# Patient Record
Sex: MALE | Race: WHITE | NOT HISPANIC OR LATINO | Employment: FULL TIME | URBAN - METROPOLITAN AREA
[De-identification: names, ages, dates, MRNs, and addresses within clinical notes are randomized per-mention and may not be internally consistent; named-entity substitution may affect disease eponyms.]

---

## 2024-02-29 ENCOUNTER — HOSPITAL ENCOUNTER (OUTPATIENT)
Dept: RADIOLOGY | Facility: MEDICAL CENTER | Age: 44
End: 2024-02-29
Attending: PHYSICIAN ASSISTANT
Payer: COMMERCIAL

## 2024-02-29 ENCOUNTER — HOSPITAL ENCOUNTER (EMERGENCY)
Facility: MEDICAL CENTER | Age: 44
End: 2024-02-29
Attending: EMERGENCY MEDICINE
Payer: COMMERCIAL

## 2024-02-29 ENCOUNTER — APPOINTMENT (OUTPATIENT)
Dept: RADIOLOGY | Facility: MEDICAL CENTER | Age: 44
End: 2024-02-29
Attending: EMERGENCY MEDICINE
Payer: COMMERCIAL

## 2024-02-29 ENCOUNTER — OFFICE VISIT (OUTPATIENT)
Dept: URGENT CARE | Facility: CLINIC | Age: 44
End: 2024-02-29
Payer: COMMERCIAL

## 2024-02-29 VITALS
BODY MASS INDEX: 33.3 KG/M2 | WEIGHT: 273.59 LBS | DIASTOLIC BLOOD PRESSURE: 81 MMHG | SYSTOLIC BLOOD PRESSURE: 132 MMHG | TEMPERATURE: 98 F | HEART RATE: 71 BPM | OXYGEN SATURATION: 95 % | RESPIRATION RATE: 12 BRPM

## 2024-02-29 DIAGNOSIS — M25.572 ACUTE LEFT ANKLE PAIN: ICD-10-CM

## 2024-02-29 DIAGNOSIS — R06.00 DYSPNEA, UNSPECIFIED TYPE: ICD-10-CM

## 2024-02-29 DIAGNOSIS — R07.9 CHEST PAIN, UNSPECIFIED TYPE: ICD-10-CM

## 2024-02-29 DIAGNOSIS — I82.462 ACUTE DEEP VEIN THROMBOSIS (DVT) OF CALF MUSCLE VEIN OF LEFT LOWER EXTREMITY (HCC): ICD-10-CM

## 2024-02-29 LAB
ALBUMIN SERPL BCP-MCNC: 4.6 G/DL (ref 3.2–4.9)
ALBUMIN/GLOB SERPL: 1.4 G/DL
ALP SERPL-CCNC: 77 U/L (ref 30–99)
ALT SERPL-CCNC: 27 U/L (ref 2–50)
ANION GAP SERPL CALC-SCNC: 15 MMOL/L (ref 7–16)
APTT PPP: 31.2 SEC (ref 24.7–36)
AST SERPL-CCNC: 21 U/L (ref 12–45)
BASOPHILS # BLD AUTO: 0.8 % (ref 0–1.8)
BASOPHILS # BLD: 0.08 K/UL (ref 0–0.12)
BILIRUB SERPL-MCNC: 0.5 MG/DL (ref 0.1–1.5)
BUN SERPL-MCNC: 14 MG/DL (ref 8–22)
CALCIUM ALBUM COR SERPL-MCNC: 9.2 MG/DL (ref 8.5–10.5)
CALCIUM SERPL-MCNC: 9.7 MG/DL (ref 8.5–10.5)
CHLORIDE SERPL-SCNC: 107 MMOL/L (ref 96–112)
CO2 SERPL-SCNC: 20 MMOL/L (ref 20–33)
CREAT SERPL-MCNC: 1.01 MG/DL (ref 0.5–1.4)
EKG IMPRESSION: NORMAL
EOSINOPHIL # BLD AUTO: 0.15 K/UL (ref 0–0.51)
EOSINOPHIL NFR BLD: 1.5 % (ref 0–6.9)
ERYTHROCYTE [DISTWIDTH] IN BLOOD BY AUTOMATED COUNT: 40.6 FL (ref 35.9–50)
GFR SERPLBLD CREATININE-BSD FMLA CKD-EPI: 94 ML/MIN/1.73 M 2
GLOBULIN SER CALC-MCNC: 3.2 G/DL (ref 1.9–3.5)
GLUCOSE SERPL-MCNC: 94 MG/DL (ref 65–99)
HCT VFR BLD AUTO: 41.9 % (ref 42–52)
HGB BLD-MCNC: 15.7 G/DL (ref 14–18)
IMM GRANULOCYTES # BLD AUTO: 0.04 K/UL (ref 0–0.11)
IMM GRANULOCYTES NFR BLD AUTO: 0.4 % (ref 0–0.9)
INR PPP: 1.06 (ref 0.87–1.13)
LYMPHOCYTES # BLD AUTO: 3.11 K/UL (ref 1–4.8)
LYMPHOCYTES NFR BLD: 31.5 % (ref 22–41)
MCH RBC QN AUTO: 30.3 PG (ref 27–33)
MCHC RBC AUTO-ENTMCNC: 37.5 G/DL (ref 32.3–36.5)
MCV RBC AUTO: 80.9 FL (ref 81.4–97.8)
MONOCYTES # BLD AUTO: 0.87 K/UL (ref 0–0.85)
MONOCYTES NFR BLD AUTO: 8.8 % (ref 0–13.4)
NEUTROPHILS # BLD AUTO: 5.62 K/UL (ref 1.82–7.42)
NEUTROPHILS NFR BLD: 57 % (ref 44–72)
NRBC # BLD AUTO: 0 K/UL
NRBC BLD-RTO: 0 /100 WBC (ref 0–0.2)
NT-PROBNP SERPL IA-MCNC: <36 PG/ML (ref 0–125)
PLATELET # BLD AUTO: 402 K/UL (ref 164–446)
PMV BLD AUTO: 9.3 FL (ref 9–12.9)
POTASSIUM SERPL-SCNC: 4 MMOL/L (ref 3.6–5.5)
PROT SERPL-MCNC: 7.8 G/DL (ref 6–8.2)
PROTHROMBIN TIME: 14 SEC (ref 12–14.6)
RBC # BLD AUTO: 5.18 M/UL (ref 4.7–6.1)
SODIUM SERPL-SCNC: 142 MMOL/L (ref 135–145)
TROPONIN T SERPL-MCNC: 6 NG/L (ref 6–19)
WBC # BLD AUTO: 9.9 K/UL (ref 4.8–10.8)

## 2024-02-29 PROCEDURE — 84484 ASSAY OF TROPONIN QUANT: CPT

## 2024-02-29 PROCEDURE — 71045 X-RAY EXAM CHEST 1 VIEW: CPT

## 2024-02-29 PROCEDURE — 71275 CT ANGIOGRAPHY CHEST: CPT

## 2024-02-29 PROCEDURE — 93005 ELECTROCARDIOGRAM TRACING: CPT

## 2024-02-29 PROCEDURE — 94760 N-INVAS EAR/PLS OXIMETRY 1: CPT

## 2024-02-29 PROCEDURE — 85025 COMPLETE CBC W/AUTO DIFF WBC: CPT

## 2024-02-29 PROCEDURE — A9270 NON-COVERED ITEM OR SERVICE: HCPCS | Performed by: EMERGENCY MEDICINE

## 2024-02-29 PROCEDURE — 700102 HCHG RX REV CODE 250 W/ 637 OVERRIDE(OP): Performed by: EMERGENCY MEDICINE

## 2024-02-29 PROCEDURE — 700117 HCHG RX CONTRAST REV CODE 255: Performed by: EMERGENCY MEDICINE

## 2024-02-29 PROCEDURE — 85730 THROMBOPLASTIN TIME PARTIAL: CPT

## 2024-02-29 PROCEDURE — 85610 PROTHROMBIN TIME: CPT

## 2024-02-29 PROCEDURE — 36415 COLL VENOUS BLD VENIPUNCTURE: CPT

## 2024-02-29 PROCEDURE — 83880 ASSAY OF NATRIURETIC PEPTIDE: CPT

## 2024-02-29 PROCEDURE — 80053 COMPREHEN METABOLIC PANEL: CPT

## 2024-02-29 PROCEDURE — 99284 EMERGENCY DEPT VISIT MOD MDM: CPT

## 2024-02-29 PROCEDURE — 93971 EXTREMITY STUDY: CPT

## 2024-02-29 PROCEDURE — 93005 ELECTROCARDIOGRAM TRACING: CPT | Performed by: EMERGENCY MEDICINE

## 2024-02-29 PROCEDURE — 73700 CT LOWER EXTREMITY W/O DYE: CPT | Mod: LT

## 2024-02-29 RX ADMIN — IOHEXOL 50 ML: 350 INJECTION, SOLUTION INTRAVENOUS at 14:45

## 2024-02-29 RX ADMIN — RIVAROXABAN 15 MG: 15 TABLET, FILM COATED ORAL at 23:32

## 2024-03-01 NOTE — ED NOTES
Patient provided discharge instructions. Patient verbalized understanding. Patient leaving ER in stable condition. Patient ambulatory with steady gait.  IV removed.

## 2024-03-01 NOTE — DISCHARGE INSTRUCTIONS
Follow-up 1 to 2 days with primary care in [place] for reevaluation, medication management and assistance with urgent outpatient referral to cardiology for exertional chest pain, shortness of breath as well as new DVT.  A referral has been placed for anticoagulation clinic and cardiology.  You should receive phone calls for appointment scheduling, otherwise call,) emergency department visit and schedule appointments for follow-up.    Start taking Xarelto twice daily for 21 days for DVT.  You will need to take a different dose daily for at least 3 months thereafter.  Primary care follow-up is imperative.    We discussed admission to the hospital for further cardiac evaluation, you declined, agreed to outpatient follow-up and referrals have been placed.  You may return at any time for reevaluation and further workup.    You are encouraged to return immediately for recurrence, worsening chest pain, shortness of breath, palpitations, syncope, fever, altered mental status or other new concerns.

## 2024-03-01 NOTE — ED PROVIDER NOTES
ED Provider Note    CHIEF COMPLAINT  Chief Complaint   Patient presents with    Shortness of Breath    Chest Pain     Intermittent sx for 3 days.  Dx with DVT in L calf       EXTERNAL RECORDS REVIEWED  Outpatient Notes left ankle x-ray and CT are with arthritic changes only.  Outpatient DVT study from earlier today with occlusive DVT in the left posterior tibial vein.  Suggestion of thrombus in posterior.  Peroneal vein as well.    HPI/ROS  LIMITATION TO HISTORY   Select: : None  OUTSIDE HISTORIAN(S):  None    Anil Hutchinson is a 43 y.o. male who presents to the emergency department through triage, referred by urgent care and [facility] NP, for further evaluation.  Patient presented to [facility] earlier today for 5 days of left lower leg and ankle pain and swelling.  Denies trauma, injury.  Denies new activity, known strain pattern.  Denies discoloration.  X-ray, CT were unremarkable but DVT study positive.  Patient did have Achilles tendon rupture September 2023.  Patient does provide history for some intermittent, but chronic, chest pain or shortness of breath.  Patient states months long dyspnea on exertion, occasional chest pain on exertion and recovers with rest.  No orthopnea.  No lower extremity edema.  Patient feels as though the symptoms began after COVID last summer.    Patient has been making healthy choices and lost almost 30 pounds in the last year.  Family history includes mother with MI in her early 60s, father with critical aortic stenosis in his 60s as well.  Patient states he had a normal treadmill stress test 1 year ago in [place] and was supposed to be read forward, for risk factors alone, to cardiology here in [place] but has not done so yet.    PAST MEDICAL HISTORY   Denies    SURGICAL HISTORY  patient denies any surgical history    FAMILY HISTORY  No family history on file.    SOCIAL HISTORY  Social History     Tobacco Use    Smoking status: Never    Smokeless tobacco: Never   Substance and Sexual  Activity    Alcohol use: Not on file    Drug use: Not on file    Sexual activity: Not on file       CURRENT MEDICATIONS  Home Medications    **Home medications have not yet been reviewed for this encounter**         ALLERGIES  No Known Allergies    PHYSICAL EXAM  VITAL SIGNS: /81   Pulse 71   Temp 36.7 °C (98 °F) (Temporal)   Resp 12   Wt 124 kg (273 lb 9.5 oz)   SpO2 95%   BMI 33.30 kg/m²    Pulse ox interpretation: I interpret this pulse ox as normal.  Constitutional: Alert in no apparent distress.  HENT: Normocephalic, atraumatic. Bilateral external ears normal, Nose normal. Moist mucous membranes.    Eyes: Pupils are equal and reactive, Conjunctiva normal.   Neck: Normal range of motion, Supple.  Lymphatic: No lymphadenopathy noted.   Cardiovascular: Regular rate and rhythm, no murmurs. Distal pulses intact.  No peripheral edema.  Thorax & Lungs: Normal breath sounds.  No wheezing/rales/ronchi. No increased work of breathing, clipped speech or retractions.   Abdomen: Soft, non-distended, non-tender to palpation. No palpable or pulsatile masses. No peritoneal signs.  Skin: Warm, Dry, No erythema, No rash.   Musculoskeletal: Tenderness to palpation left superior medial malleolus.  No crepitus or deformity.  Patient does have some mild circumferential swelling at this location compared to the right but states he has had such since he had an Achilles injury in September.  No paresthesias.  Distal pulses intact, sensation intact light touch distally.  No calf swelling compared to right.  Full range of motion otherwise lower extremity  Neurologic: Alert and orient x 4  Psychiatric: Affect normal, Judgment normal, Mood normal.       DIAGNOSTIC STUDIES / PROCEDURES    LABS  Results for orders placed or performed during the hospital encounter of 02/29/24   CBC with Differential   Result Value Ref Range    WBC 9.9 4.8 - 10.8 K/uL    RBC 5.18 4.70 - 6.10 M/uL    Hemoglobin 15.7 14.0 - 18.0 g/dL    Hematocrit  41.9 (L) 42.0 - 52.0 %    MCV 80.9 (L) 81.4 - 97.8 fL    MCH 30.3 27.0 - 33.0 pg    MCHC 37.5 (H) 32.3 - 36.5 g/dL    RDW 40.6 35.9 - 50.0 fL    Platelet Count 402 164 - 446 K/uL    MPV 9.3 9.0 - 12.9 fL    Neutrophils-Polys 57.00 44.00 - 72.00 %    Lymphocytes 31.50 22.00 - 41.00 %    Monocytes 8.80 0.00 - 13.40 %    Eosinophils 1.50 0.00 - 6.90 %    Basophils 0.80 0.00 - 1.80 %    Immature Granulocytes 0.40 0.00 - 0.90 %    Nucleated RBC 0.00 0.00 - 0.20 /100 WBC    Neutrophils (Absolute) 5.62 1.82 - 7.42 K/uL    Lymphs (Absolute) 3.11 1.00 - 4.80 K/uL    Monos (Absolute) 0.87 (H) 0.00 - 0.85 K/uL    Eos (Absolute) 0.15 0.00 - 0.51 K/uL    Baso (Absolute) 0.08 0.00 - 0.12 K/uL    Immature Granulocytes (abs) 0.04 0.00 - 0.11 K/uL    NRBC (Absolute) 0.00 K/uL   Comp Metabolic Panel   Result Value Ref Range    Sodium 142 135 - 145 mmol/L    Potassium 4.0 3.6 - 5.5 mmol/L    Chloride 107 96 - 112 mmol/L    Co2 20 20 - 33 mmol/L    Anion Gap 15.0 7.0 - 16.0    Glucose 94 65 - 99 mg/dL    Bun 14 8 - 22 mg/dL    Creatinine 1.01 0.50 - 1.40 mg/dL    Calcium 9.7 8.5 - 10.5 mg/dL    Correct Calcium 9.2 8.5 - 10.5 mg/dL    AST(SGOT) 21 12 - 45 U/L    ALT(SGPT) 27 2 - 50 U/L    Alkaline Phosphatase 77 30 - 99 U/L    Total Bilirubin 0.5 0.1 - 1.5 mg/dL    Albumin 4.6 3.2 - 4.9 g/dL    Total Protein 7.8 6.0 - 8.2 g/dL    Globulin 3.2 1.9 - 3.5 g/dL    A-G Ratio 1.4 g/dL   ESTIMATED GFR   Result Value Ref Range    GFR (CKD-EPI) 94 >60 mL/min/1.73 m 2   TROPONIN   Result Value Ref Range    Troponin T 6 6 - 19 ng/L   proBrain Natriuretic Peptide, NT   Result Value Ref Range    NT-proBNP <36 0 - 125 pg/mL   PT/INR   Result Value Ref Range    PT 14.0 12.0 - 14.6 sec    INR 1.06 0.87 - 1.13   APTT   Result Value Ref Range    APTT 31.2 24.7 - 36.0 sec   EKG   Result Value Ref Range    Report       [facility] Emergency Dept.    Test Date:  2024-02-29  Pt Name:    AARON CUENCA               Department: ER  MRN:         [id number]                      Room:  Gender:     Male                         Technician: [id number]  :        1980                   Requested By:ER TRIAGE PROTOCOL  Order #:    [id number]                    Reading MD: MICH AGUILA DO    Measurements  Intervals                                Axis  Rate:       85                           P:          19  HI:         184                          QRS:        43  QRSD:       104                          T:          53  QT:         365  QTc:        434    Interpretive Statements  Sinus rhythm  No previous ECG available for comparison  Electronically Signed On 2024 20:44:42 PST by MICH AGUILA DO       RADIOLOGY  I have independently interpreted the diagnostic imaging associated with this visit and am waiting the final reading from the radiologist.     Radiologist interpretation:   CT-CTA CHEST PULMONARY ARTERY W/ RECONS   Final Result         1.  No evidence of pulmonary embolism.   2.  No acute abnormality.   3.  Prior granulomatous disease/infection.   4.  Possible mild hepatic steatosis.   5.  Tiny 3 mm right middle lobe pulmonary nodule.      Fleischner Society pulmonary nodule recommendations:   Low Risk: No routine follow-up      High Risk: Optional CT at 12 months      Comments: Nodules less than 6 mm do not require routine follow-up, but certain patients at high risk with suspicious nodule morphology, upper lobe location, or both may warrant 12-month follow-up.      Low Risk - Minimal or absent history of smoking and of other known risk factors.      High Risk - History of smoking or of other known risk factors.      Note: These recommendations do not apply to lung cancer screening, patients with immunosuppression, or patients with known primary cancer.      Fleischner Society 2017 Guidelines for Management of Incidentally Detected Pulmonary Nodules in Adults      DX-CHEST-PORTABLE (1 VIEW)   Final Result         No acute cardiac or  pulmonary abnormality is identified.          COURSE & MEDICAL DECISION MAKING    ED Observation Status? No; Patient does not meet criteria for ED Observation.     INITIAL ASSESSMENT, COURSE AND PLAN  Care Narrative:   .8:34 PM seen evaluated bedside.  Describes chest pain, shortness of breath intermittent for months.  This week he has had  left ankle pain and swelling.  Found to have low leg DVT earlier today.  Referred to ED for further evaluation.  Add CTA chest.    CT of the chest without evidence for PE.  No other gross abnormality noted.  Left lower extremity DVT although CMS intact distally.  Denies other medical problems or daily medications.  Requests outpatient management.  First dose of Xarelto in the emergency department.  However, patient does have risk factors, heart score 5, family history, obesity, elevated blood pressure without recent prior cardiac evaluation did recommend admission for such given ongoing chest pain, shortness of breath, mostly exertional and improves at rest, however patient adamantly declines.  Agrees to outpatient follow-up for which referral to anticoagulation clinic and cardiology have been placed.    HTN/IDDM FOLLOW UP:  The patient is referred to a primary physician for blood pressure management, diabetic screening, and for all other preventive health concerns    ADDITIONAL PROBLEM LIST  Elevated blood pressure    DISPOSITION AND DISCUSSIONS    Discussion of management with other QHP or appropriate source(s): None     Escalation of care considered, and ultimately not performed:after discussion with the patient / family, they have elected to decline an escalation in care    Decision tools and prescription drugs considered including, but not limited to: HEART Score 5 .    The patient is stable for discharge home, anticipatory guidance provided, Xarelto twice daily for 21 days then daily there after to be managed by primary care and anticoagulation clinic, close follow-up is  encouraged and strict return instructions have been discussed. Patient is agreeable to the disposition and plan.      FINAL DIAGNOSIS  1. Acute deep vein thrombosis (DVT) of calf muscle vein of left lower extremity (HCC)    2. Dyspnea, unspecified type    3. Chest pain, unspecified type           Electronically signed by: Shanthi Hughes D.O., 2/29/2024 8:40 PM

## 2024-03-01 NOTE — RESULT ENCOUNTER NOTE
Discussed positive DVT result with the patient.  He is already in the ER getting treatment and workup.  Reviewed no fracture on CT.  He will review the rest of his CT results with the ankle and foot team on follow-up.

## 2024-03-01 NOTE — PROGRESS NOTES
Received call from radiologist for positive DVT. Was able to read report, positive for posterior tibial vein and peroneal vein. Wash not able to reach patient either by phone or text. Called spouse. She relates that he has not had any injury since last September. Has not been immobilized. He had Covid last summer and has had “significant dyspnea with exertion” since that time, wife is a RN and has urged him to follow up with PCP but he has not. Both his parents  young with cardiac problems. He lives in [place] and wife is currently out of state. I recommended him to present to [facility] ER for evaluation. I can identify no obvious precipitatating event and therefore is unprovoked. Wife assured me she would be able to speak with patient and send him to the ER    Janiya Buckley, Swapnil  [phone]

## 2024-03-01 NOTE — ED TRIAGE NOTES
Chief Complaint   Patient presents with    Shortness of Breath    Chest Pain     Intermittent sx for 3 days.  Dx with DVT in L calf     Pt sent here to rule out PE.

## 2024-03-04 ENCOUNTER — TELEPHONE (OUTPATIENT)
Dept: MEDICAL GROUP | Facility: MEDICAL CENTER | Age: 44
End: 2024-03-04
Payer: COMMERCIAL

## 2024-03-04 NOTE — TELEPHONE ENCOUNTER
[facility]     Received anticoagulation referral from Dr Hughes on 2/29/24.     Called patient to schedule an appt to establish care. No answer. LVM.  MyChart message sent.    1st call     Insurance: BCBS  PCP: None  Locations to be seen: [facility]    If no response by 4/4/24 OR 2 no shows/cancellations, will remove from referral list     [facility] at [phone], fax [phone]    Dav WilkinsonD

## 2024-03-06 ENCOUNTER — TELEPHONE (OUTPATIENT)
Dept: VASCULAR LAB | Facility: MEDICAL CENTER | Age: 44
End: 2024-03-06
Payer: COMMERCIAL

## 2024-03-06 NOTE — TELEPHONE ENCOUNTER
[facility]     Received anticoagulation referral from Dr. Hughes on 2/29/24.     Called and spoke to patient. Initial visit scheduled on 3/7/24 at 7:45 am.     Insurance: [employer]  PCP: None  Locations to be seen: [facility]      If no response by 3/29/24 (1 month from referral date) OR 2 no shows/cancellations, will remove from referral list and send FYI to referring provider    [facility] at [phone], fax [phone]    Jose De Jesus Maldonado, PharmD, BCACP

## 2024-03-07 ENCOUNTER — ANCILLARY PROCEDURE (OUTPATIENT)
Dept: VASCULAR LAB | Facility: MEDICAL CENTER | Age: 44
End: 2024-03-07
Attending: INTERNAL MEDICINE
Payer: COMMERCIAL

## 2024-03-07 ENCOUNTER — TELEPHONE (OUTPATIENT)
Dept: VASCULAR LAB | Facility: MEDICAL CENTER | Age: 44
End: 2024-03-07
Payer: COMMERCIAL

## 2024-03-07 ENCOUNTER — DOCUMENTATION (OUTPATIENT)
Dept: CARDIOLOGY | Facility: MEDICAL CENTER | Age: 44
End: 2024-03-07
Payer: COMMERCIAL

## 2024-03-07 VITALS — HEART RATE: 71 BPM | SYSTOLIC BLOOD PRESSURE: 129 MMHG | DIASTOLIC BLOOD PRESSURE: 82 MMHG

## 2024-03-07 DIAGNOSIS — I82.462 ACUTE DEEP VEIN THROMBOSIS (DVT) OF CALF MUSCLE VEIN OF LEFT LOWER EXTREMITY (HCC): ICD-10-CM

## 2024-03-07 PROBLEM — I82.409 DEEP VEIN THROMBOSIS (HCC): Status: ACTIVE | Noted: 2024-03-07

## 2024-03-07 PROCEDURE — 99213 OFFICE O/P EST LOW 20 MIN: CPT | Performed by: NURSE PRACTITIONER

## 2024-03-07 NOTE — TELEPHONE ENCOUNTER
ANTI-[facility]AG  Caller: Anil Hutchinson    Topic/issue: Patient called the [facility] pharmacy. Patient states they have not received his prescription for Xarelto. Patient can come back to  a paper script if able. Please call back to let him know next steps.    Callback Number: [phone]    Thank you,  Janis AMBROCIO    
Called [facility] and was advised that Xarelto is ready for . Called patient and advised. He will  the medication from [facility] instead of [facility]. No further action needed at this time.    Ashley Leija, DavD, BCACP    
Wheelchair/Stroller

## 2024-03-07 NOTE — PROGRESS NOTES
Initial anticoag note and most recent ed note reviewed.   Below knee dvt without obvious provoking factors  Pending further recs from pcp, we will continue with 3 mo of oac and then have patient evaluated by apn to determine whether or not to consider extended therapy (IIb indication) and/or further w/u    Will defer any indicated age appropriate screening for occult malignancy to pcp.    Michael Bloch, MD  Anticoagulation Clinic    Cc:  IGOR Croft

## 2024-03-07 NOTE — PROGRESS NOTES
"NEW DOAC   .  Anticoagulation Patient Findings      PCP: Pcp Pt States None  Cardiologist: n/a  Dx: DVT  CHADSVASC = n/a  HAS-BLED = 0  Target End Date =  3 months (5/29/24)    Pt Hx: Pt seen at [facility] on 2/29/24 c/o left ankle pain/swelling. He remembers feeling his ankle \"pop\" a couple days prior while sitting in his vehicle and then noticed ankle pain when walking. Denies any injuries. Ankle xray just showed soft tissue edema but an u/s was ordered revealing a below the knee DVT.     No recent traumas but he did tear his left achilles tendon in Sept 2023. Also, did some long air travel around Nov/Dec in which he flew non stop to and from [place] to [facility], however, did not develop any leg/calf swelling or pain at all. No other known provoking factors. He reports ongoing SOB with exertion since having covid a couple of years ago. He had a CTPA done in the ER but was not found to have a PE. Still has SOB when going up a flight up stairs but this hasn't worsened or changed. No SOB at rest.    LLE venous duplex:  Occlusive DVT in the left posterior tibial vein. There is also suggestion of thrombus in a posterior paired peroneal vein.     CTPA:  1.  No evidence of pulmonary embolism.  2.  No acute abnormality.  3.  Prior granulomatous disease/infection.  4.  Possible mild hepatic steatosis.  5.  Tiny 3 mm right middle lobe pulmonary nodule.    He was started on Xarelto 15 mg BID x 21 days then 20 mg daily, however, he has not taken Xarelto due to [facility] in [place] being out of Xarelto. He is requesting I send his Xarelto to [facility] in [facility] and he will  today.    No prior VTEs. Unsure of any blood clot hx with his family. Has been taking an OTC testosterone-type supplement but stopped back in Nov. No hormone use since then. He works as a  in [place]. His PCP is Dr Croft in [place].    Labs:  Lab Results   Component Value Date/Time    WBC 9.9 02/29/2024 06:26 PM    RBC 5.18 " 02/29/2024 06:26 PM    HEMOGLOBIN 15.7 02/29/2024 06:26 PM    HEMATOCRIT 41.9 (L) 02/29/2024 06:26 PM    MCV 80.9 (L) 02/29/2024 06:26 PM    MCH 30.3 02/29/2024 06:26 PM    MCHC 37.5 (H) 02/29/2024 06:26 PM    MPV 9.3 02/29/2024 06:26 PM    NEUTSPOLYS 57.00 02/29/2024 06:26 PM    LYMPHOCYTES 31.50 02/29/2024 06:26 PM    MONOCYTES 8.80 02/29/2024 06:26 PM    EOSINOPHILS 1.50 02/29/2024 06:26 PM    BASOPHILS 0.80 02/29/2024 06:26 PM      Lab Results   Component Value Date/Time    SODIUM 142 02/29/2024 06:26 PM    POTASSIUM 4.0 02/29/2024 06:26 PM    CHLORIDE 107 02/29/2024 06:26 PM    CO2 20 02/29/2024 06:26 PM    GLUCOSE 94 02/29/2024 06:26 PM    BUN 14 02/29/2024 06:26 PM    CREATININE 1.01 02/29/2024 06:26 PM    Crcl ~164 ml/min    Pt is new to rivaroxaban (Xarelto) and new to [facility]. Discussed:   Indication for DOAC therapy.  Importance of monitoring and compliance.   Monitoring parameters, signs and symptoms of bleeding or clotting.  DOAC therapy, side effects, potential DDIs, OTC medications  Hormonal therapy: no  Pregnancy:  n/a  DDI: No  Pt is not on antiplatelet/NSAID therapy  Lifestyle safety, ie smoking, ETOH, hobby safety, fall safety/prevention  Procedures for missed doses or suspected missed doses, surgeries/procedures, travel, dental work, any medication changes    Start with Xarelto 15 mg taken 2 times a day for 21 days and then change to 20 mg taken once daily thereafter. Pt will transition to 20 mg daily dose on 3/28/24.    DOAC affordable: TBD but he states [facility] quoted him $30 for Xarelto so he thinks it will be affordable    Samples provided: yes; given 7 tablets - he took his first dose in clinic with me. He will  the remaining 35 tablets at [facility] this morning. Asked that he call us ASAP if he has any problems picking up Xarelto. I stressed the importance of not missing any more doses which he verbalizes understanding to.    Labs to be completed prior to next f/u - none    F/U: 2 week(s)  LOT  scheduled for 3 mo    JESSICA Pappas.    Added [facility] to care team   Send to Bloch

## 2024-03-21 ENCOUNTER — ANTICOAGULATION VISIT (OUTPATIENT)
Dept: VASCULAR LAB | Facility: MEDICAL CENTER | Age: 44
End: 2024-03-21
Attending: INTERNAL MEDICINE
Payer: COMMERCIAL

## 2024-03-21 ENCOUNTER — TELEPHONE (OUTPATIENT)
Dept: VASCULAR LAB | Facility: MEDICAL CENTER | Age: 44
End: 2024-03-21

## 2024-03-21 ENCOUNTER — OFFICE VISIT (OUTPATIENT)
Dept: CARDIOLOGY | Facility: MEDICAL CENTER | Age: 44
End: 2024-03-21
Attending: EMERGENCY MEDICINE
Payer: COMMERCIAL

## 2024-03-21 VITALS
DIASTOLIC BLOOD PRESSURE: 72 MMHG | SYSTOLIC BLOOD PRESSURE: 116 MMHG | WEIGHT: 272 LBS | BODY MASS INDEX: 33.12 KG/M2 | HEIGHT: 76 IN | HEART RATE: 70 BPM | RESPIRATION RATE: 18 BRPM | OXYGEN SATURATION: 97 %

## 2024-03-21 DIAGNOSIS — E78.49 OTHER HYPERLIPIDEMIA: ICD-10-CM

## 2024-03-21 DIAGNOSIS — I82.462 ACUTE DEEP VEIN THROMBOSIS (DVT) OF CALF MUSCLE VEIN OF LEFT LOWER EXTREMITY (HCC): ICD-10-CM

## 2024-03-21 DIAGNOSIS — Z82.49 FAMILY HISTORY OF EARLY CAD: ICD-10-CM

## 2024-03-21 PROCEDURE — 3078F DIAST BP <80 MM HG: CPT

## 2024-03-21 PROCEDURE — 99204 OFFICE O/P NEW MOD 45 MIN: CPT

## 2024-03-21 PROCEDURE — 3074F SYST BP LT 130 MM HG: CPT

## 2024-03-21 PROCEDURE — 99212 OFFICE O/P EST SF 10 MIN: CPT

## 2024-03-21 PROCEDURE — 99211 OFF/OP EST MAY X REQ PHY/QHP: CPT

## 2024-03-21 ASSESSMENT — ENCOUNTER SYMPTOMS
GASTROINTESTINAL NEGATIVE: 1
PND: 0
EYES NEGATIVE: 1
NERVOUS/ANXIOUS: 0
NEUROLOGICAL NEGATIVE: 1
CONSTITUTIONAL NEGATIVE: 1
ORTHOPNEA: 0
SHORTNESS OF BREATH: 0
DEPRESSION: 0
BACK PAIN: 1
PALPITATIONS: 0

## 2024-03-21 ASSESSMENT — FIBROSIS 4 INDEX: FIB4 SCORE: 0.44

## 2024-03-21 NOTE — PROGRESS NOTES
Chief Complaint   Patient presents with    Other     Deep vein thrombosis        Subjective     Tom Hutchinson is a 44 y.o. male who presents today for ED chest pain follow-up. They have a history of DVT    Family history includes mother with MI in her early 60s, father with critical aortic stenosis in his 60s as well.     They are a new patient to our practice.    They have been having some chest pan and shortness of breath, these symptoms have resolved since the ER.     Activity: walking and light cardio    ETOH: none, Tobacco: none, Recreational drugs: none, Caffeine: 2-3 cups per day    No symptoms of chest pain, palpitations, shortness of breath, exercise intolerance, dyspnea, or lower extremity edema.      History reviewed. No pertinent past medical history.  History reviewed. No pertinent surgical history.  History reviewed. No pertinent family history.  Social History     Socioeconomic History    Marital status:      Spouse name: Not on file    Number of children: Not on file    Years of education: Not on file    Highest education level: Not on file   Occupational History    Not on file   Tobacco Use    Smoking status: Never    Smokeless tobacco: Never   Substance and Sexual Activity    Alcohol use: Not on file    Drug use: Not on file    Sexual activity: Not on file   Other Topics Concern    Not on file   Social History Narrative    Not on file     Social Determinants of Health     Financial Resource Strain: Not on file   Food Insecurity: Not on file   Transportation Needs: Not on file   Physical Activity: Not on file   Stress: Not on file   Social Connections: Not on file   Intimate Partner Violence: Not on file   Housing Stability: Not on file     No Known Allergies  Outpatient Encounter Medications as of 3/21/2024   Medication Sig Dispense Refill    rivaroxaban (XARELTO) 20 MG Tab tablet Take 1 Tablet by mouth with dinner. 30 Tablet 3    [DISCONTINUED] rivaroxaban (XARELTO) 15 MG Tab tablet Take 1  "Tablet by mouth 2 times a day for 21 days. 35 Tablet 0    [DISCONTINUED] methylPREDNISolone (MEDROL DOSEPAK) 4 MG Tablet Therapy Pack Follow schedule on package instructions. 21 Tablet 0    [DISCONTINUED] methylPREDNISolone (MEDROL DOSEPAK) 4 MG Tablet Therapy Pack Follow schedule on package instructions. 21 Tablet 0     No facility-administered encounter medications on file as of 3/21/2024.     Review of Systems   Constitutional: Negative.    HENT: Negative.     Eyes: Negative.    Respiratory:  Negative for shortness of breath.    Cardiovascular:  Negative for chest pain, palpitations, orthopnea, leg swelling and PND.   Gastrointestinal: Negative.    Genitourinary: Negative.    Musculoskeletal:  Positive for back pain.   Skin: Negative.    Neurological: Negative.    Endo/Heme/Allergies: Negative.    Psychiatric/Behavioral:  Negative for depression. The patient is not nervous/anxious.               Objective     /72 (BP Location: Left arm, Patient Position: Sitting, BP Cuff Size: Adult)   Pulse 70   Resp 18   Ht 1.93 m (6' 4\")   Wt 123 kg (272 lb)   SpO2 97%   BMI 33.11 kg/m²     Physical Exam  Constitutional:       Appearance: Normal appearance.   HENT:      Head: Normocephalic.   Neck:      Vascular: No JVD.   Cardiovascular:      Rate and Rhythm: Normal rate and regular rhythm.      Pulses: Normal pulses.      Heart sounds: Normal heart sounds. No murmur heard.     No friction rub.   Pulmonary:      Effort: Pulmonary effort is normal.      Breath sounds: Normal breath sounds.   Abdominal:      Palpations: Abdomen is soft.   Musculoskeletal:         General: Normal range of motion.      Right lower leg: No edema.      Left lower leg: No edema.   Skin:     General: Skin is warm and dry.   Neurological:      General: No focal deficit present.      Mental Status: He is alert and oriented to person, place, and time.   Psychiatric:         Mood and Affect: Mood normal.         Behavior: Behavior normal. " "           No results found for: \"CHOLSTRLTOT\", \"LDL\", \"HDL\", \"TRIGLYCERIDE\"    Lab Results   Component Value Date/Time    SODIUM 142 02/29/2024 06:26 PM    POTASSIUM 4.0 02/29/2024 06:26 PM    CHLORIDE 107 02/29/2024 06:26 PM    CO2 20 02/29/2024 06:26 PM    GLUCOSE 94 02/29/2024 06:26 PM    BUN 14 02/29/2024 06:26 PM    CREATININE 1.01 02/29/2024 06:26 PM     Lab Results   Component Value Date/Time    ALKPHOSPHAT 77 02/29/2024 06:26 PM    ASTSGOT 21 02/29/2024 06:26 PM    ALTSGPT 27 02/29/2024 06:26 PM    TBILIRUBIN 0.5 02/29/2024 06:26 PM          Assessment & Plan     1. Family history of early CAD  Lipid Profile    CT-CARDIAC SCORING      2. 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                "

## 2024-03-21 NOTE — PROGRESS NOTES
Target end date:TBD, 5/29/24 to reassess     Indication: DVT     Drug: Xarelto 15 mg BID     CHADsVASC = n/a    Health Status Since Last Assessment   Patient denies any new relevant medical problems, ED visits or hospitalizations   Patient denies any embolic events (stroke/tia/systemic embolism)    Denies SOB CP  Leg pain gone.   Elevated leg as much as possible.     Adherence with DOAC Therapy     Pt has 1 missed any doses in the average week    Bleeding Risk Assessment     Denies Epistaxis   Pt denies any excessive or unusual bleeding/hematomas.  Pt denies any GI bleeds or hematemesis.  Pt denies any concerning daily headache or sub dural hematoma symptoms.     Pt denies any hematuria Denies   Latest Hemoglobin 15.7   ETOH overuse none     Creatinine Clearance/Renal Function     Latest ClCr > 30 mL/min    Hepatic function   Latest LFTs WNL   Pt denies any history of liver dysfunction      Drug Interactions   Platelets: 402   ASA/other antiplatelets none   NSAID none   Other drug interactions none   X Verified no anticonvulsant or azole therapy, education provided for future use.     Examination   Blood Pressure WNL   Symptomatic hypotension none   Significant gait impairment/imbalance/high risk for falls? none    Final Assessment and Recommendations:   Patient appears stable from the anticoagulation standpoint.     Benefits of continued DOAC therapy outweigh risks for this patient   Recommend pt continue with current DOAC therapy    DOAC is  affordable     Other Actions: No labs ordered as pt seems very stable.     Follow up:   Will follow up with patient 2  months.     Dennis Acosta, PharmD

## 2024-03-21 NOTE — TELEPHONE ENCOUNTER
Received New start PA request via [employer]  for XARELTO 20MG TABLETS. (Quantity:90, Day Supply:90)     Insurance: [employer]   Member ID:  [id number]  BIN: [id number]  PCN: [id number]  Group: [id number]     Ran Test claim via [place] & medication Pays for a $60/90DS ; $30/30DS copay. Will outreach to patient to offer specialty pharmacy services and or release to preferred pharmacy    AQUILES Altamirano, PhT  Vascular Pharmacy Liaison (Rx Coordinator)  P: [phone]  3/21/2024 11:50 AM

## 2024-03-29 ENCOUNTER — HOSPITAL ENCOUNTER (OUTPATIENT)
Dept: RADIOLOGY | Facility: MEDICAL CENTER | Age: 44
End: 2024-03-29
Payer: COMMERCIAL

## 2024-03-29 DIAGNOSIS — Z82.49 FAMILY HISTORY OF EARLY CAD: ICD-10-CM

## 2024-03-29 PROCEDURE — 4410556 CT-CARDIAC SCORING (SELF PAY ONLY)

## 2024-05-23 ENCOUNTER — DOCUMENTATION (OUTPATIENT)
Dept: VASCULAR LAB | Facility: MEDICAL CENTER | Age: 44
End: 2024-05-23
Payer: COMMERCIAL

## 2024-05-24 NOTE — PROGRESS NOTES
Pt missed his LOT appt in the anticoagulation clinic today. Called pt and left a vm asking him to call [phone] to reschedule.    Guerline BEE

## 2024-06-04 DIAGNOSIS — Z79.01 CHRONIC ANTICOAGULATION: ICD-10-CM

## 2024-07-23 ENCOUNTER — PATIENT MESSAGE (OUTPATIENT)
Dept: VASCULAR LAB | Facility: MEDICAL CENTER | Age: 44
End: 2024-07-23
Payer: COMMERCIAL

## 2024-07-23 DIAGNOSIS — Z79.01 CHRONIC ANTICOAGULATION: ICD-10-CM
